# Patient Record
Sex: FEMALE | Race: WHITE | Employment: UNEMPLOYED | URBAN - NONMETROPOLITAN AREA
[De-identification: names, ages, dates, MRNs, and addresses within clinical notes are randomized per-mention and may not be internally consistent; named-entity substitution may affect disease eponyms.]

---

## 2019-06-02 ENCOUNTER — HOSPITAL ENCOUNTER (EMERGENCY)
Age: 39
Discharge: HOME | End: 2019-06-02

## 2019-06-02 VITALS
HEIGHT: 66 IN | RESPIRATION RATE: 16 BRPM | WEIGHT: 160 LBS | DIASTOLIC BLOOD PRESSURE: 72 MMHG | SYSTOLIC BLOOD PRESSURE: 120 MMHG | BODY MASS INDEX: 25.71 KG/M2 | HEART RATE: 87 BPM | OXYGEN SATURATION: 97 % | TEMPERATURE: 98 F

## 2019-06-02 DIAGNOSIS — N30.01 ACUTE CYSTITIS WITH HEMATURIA: Primary | ICD-10-CM

## 2019-06-02 LAB
BILIRUBIN URINE: ABNORMAL
BLOOD, URINE: ABNORMAL
CHARACTER, URINE: ABNORMAL
COLOR: ABNORMAL
GLUCOSE, URINE: NEGATIVE MG/DL
KETONES, URINE: ABNORMAL
LEUKOCYTES, UA: ABNORMAL
NITRATE, UA: POSITIVE
PH UA: 5.5 (ref 5–9)
PROTEIN UA: >= 300 MG/DL
REFLEX TO URINE C & S: ABNORMAL
SPECIFIC GRAVITY UA: >= 1.03 (ref 1–1.03)
UROBILINOGEN, URINE: 0.2 EU/DL (ref 0–1)

## 2019-06-02 PROCEDURE — 99203 OFFICE O/P NEW LOW 30 MIN: CPT

## 2019-06-02 PROCEDURE — 87186 SC STD MICRODIL/AGAR DIL: CPT

## 2019-06-02 PROCEDURE — 87086 URINE CULTURE/COLONY COUNT: CPT

## 2019-06-02 PROCEDURE — 87077 CULTURE AEROBIC IDENTIFY: CPT

## 2019-06-02 PROCEDURE — 99203 OFFICE O/P NEW LOW 30 MIN: CPT | Performed by: NURSE PRACTITIONER

## 2019-06-02 PROCEDURE — 81003 URINALYSIS AUTO W/O SCOPE: CPT

## 2019-06-02 PROCEDURE — 87184 SC STD DISK METHOD PER PLATE: CPT

## 2019-06-02 RX ORDER — PHENAZOPYRIDINE HYDROCHLORIDE 200 MG/1
200 TABLET, FILM COATED ORAL EVERY 8 HOURS PRN
Qty: 9 TABLET | Refills: 0 | Status: SHIPPED | OUTPATIENT
Start: 2019-06-02 | End: 2019-06-05

## 2019-06-02 RX ORDER — BUPROPION HYDROCHLORIDE 300 MG/1
300 TABLET ORAL
COMMUNITY
Start: 2016-12-27

## 2019-06-02 RX ORDER — SULFAMETHOXAZOLE AND TRIMETHOPRIM 800; 160 MG/1; MG/1
1 TABLET ORAL 2 TIMES DAILY
Qty: 14 TABLET | Refills: 0 | Status: SHIPPED | OUTPATIENT
Start: 2019-06-02 | End: 2019-06-09

## 2019-06-02 ASSESSMENT — PAIN DESCRIPTION - PAIN TYPE: TYPE: ACUTE PAIN

## 2019-06-02 ASSESSMENT — PAIN DESCRIPTION - FREQUENCY: FREQUENCY: INTERMITTENT

## 2019-06-02 ASSESSMENT — PAIN DESCRIPTION - DESCRIPTORS: DESCRIPTORS: ACHING;BURNING

## 2019-06-02 ASSESSMENT — PAIN DESCRIPTION - LOCATION: LOCATION: ABDOMEN

## 2019-06-02 ASSESSMENT — PAIN DESCRIPTION - ORIENTATION: ORIENTATION: LOWER

## 2019-06-02 ASSESSMENT — PAIN DESCRIPTION - PROGRESSION: CLINICAL_PROGRESSION: NOT CHANGED

## 2019-06-02 ASSESSMENT — PAIN SCALES - GENERAL: PAINLEVEL_OUTOF10: 2

## 2019-06-02 NOTE — ED NOTES
Discharge assessment complete. No changes. All discharge education and information given. Instructed to go to ED for any worsening symptoms. Verbalized Understanding. Left in stable cond.      Gurdeep Triana RN  06/02/19 [id number]

## 2019-06-02 NOTE — ED TRIAGE NOTES
Pt ambulated to room, tolerated well. Pt stated that yesterday she started to get urine in her blood. Pt stated that it is getting worse.

## 2019-06-04 LAB
ORGANISM: ABNORMAL
URINE CULTURE REFLEX: ABNORMAL

## 2019-06-07 ASSESSMENT — ENCOUNTER SYMPTOMS
BACK PAIN: 0
NAUSEA: 0
ABDOMINAL PAIN: 0
SHORTNESS OF BREATH: 0
DIARRHEA: 0
VOMITING: 0

## 2019-06-07 NOTE — ED PROVIDER NOTES
[facility]      CHIEFCOMPLAINT       Chief Complaint   Patient presents with    Urinary Tract Infection     started yesterday, blood in urine       Nurses Notes reviewed and I agree except as noted in the HPI. HISTORY OF PRESENT ILLNESS   Marleen Bradley is a 37 y.o. female who presents: The history is provided by the patient. Dysuria    This is a new problem. The current episode started yesterday. The problem occurs intermittently. The problem has been gradually worsening. The quality of the pain is described as burning. The pain is at a severity of 2/10. The pain is mild. There has been no fever. Associated symptoms include frequency, hematuria and urgency. Pertinent negatives include no chills, no sweats, no nausea, no vomiting, no discharge and no flank pain. She has tried nothing for the symptoms. Her past medical history does not include recurrent UTIs. REVIEW OF SYSTEMS     Review of Systems   Constitutional: Negative for activity change, appetite change, chills, diaphoresis, fatigue and fever. Eyes: Negative for visual disturbance. Respiratory: Negative for shortness of breath. Cardiovascular: Negative for chest pain. Gastrointestinal: Negative for abdominal pain, diarrhea, nausea and vomiting. Genitourinary: Positive for dysuria, frequency, hematuria and urgency. Negative for difficulty urinating, flank pain, pelvic pain and vaginal bleeding. Musculoskeletal: Negative for arthralgias and back pain. Neurological: Negative for dizziness and headaches. Hematological: Negative for adenopathy. Psychiatric/Behavioral: The patient is not nervous/anxious. PAST MEDICAL HISTORY   History reviewed. No pertinent past medical history. SURGICAL HISTORY     Patient  has no past surgical history on file.     CURRENT MEDICATIONS       Discharge Medication List as of 6/2/2019  5:31 PM      CONTINUE these medications which have NOT CHANGED is not diaphoretic. No cyanosis or erythema. No pallor. No obvious signs of infection or trauma. Psychiatric: She has a normal mood and affect. Her behavior is normal.   Nursing note and vitals reviewed. DIAGNOSTIC RESULTS   Labs:  Results for orders placed or performed during the hospital encounter of 06/02/19   Urine Culture, Reflexed   Result Value Ref Range    Organism Escherichia coli (A)     Urine Culture Reflex [place] count: >100,000 CFU/mL        Susceptibility    Escherichia coli - BACTERIAL SUSCEPTIBILITY PANEL BY NIK     gentamicin <=1 Sensitive mcg/mL     tetracycline <=1 Sensitive mcg/mL     cefOXitin <=4 Sensitive mcg/mL     nitrofurantoin <=16 Sensitive mcg/mL     ciprofloxacin =0.012 Sensitive mcg/mL     trimethoprim-sulfamethoxazole <=20 Sensitive mcg/mL     cefuroxime  Sensitive    UA without Microscopic Reflex C&S   Result Value Ref Range    Glucose, Urine Negative NEGATIVE mg/dl    Bilirubin Urine Small (A) NEGATIVE    Ketones, Urine Trace (A) NEGATIVE    Specific Gravity, UA >=1.030 1.002 - 1.03    Blood, Urine Large (A) NEGATIVE    pH, UA 5.50 5.0 - 9.0    Protein, UA >= 300 (A) NEGATIVE mg/dl    Urobilinogen, Urine 0.20 0.0 - 1.0 eu/dl    Nitrate, UA Positive (A) NEGATIVE    LEUKOCYTES, UA Moderate (A) NEGATIVE    Color, UA Dark yellow (A) STRAW-YELL    Character, Urine Cloudy (A) CLEAR-SL C    REFLEX TO URINE C & S INDICATED        IMAGING:    URGENT CARE COURSE:     Vitals:    06/02/19 1706   BP: 120/72   Pulse: 87   Resp: 16   Temp: 98 °F (36.7 °C)   TempSrc: Tympanic   SpO2: 97%   Weight: 160 lb (72.6 kg)   Height: 5' 6\" (1.676 m)       Medications - No data to display  PROCEDURES:  None  FINAL IMPRESSION       1.  Acute cystitis with hematuria        DISPOSITION/PLAN   DISPOSITION Decision To Discharge 06/02/2019 05:29:49 PM   UA positive for nitrates, Leuks, blood sent for urine culture  Start on Bactrim DS and pyridium as directed  Patient advised to drink plenty of fluids and take medication as prescribed. Advised to follow up with family doctor. Patient is also advised to go to emergency room if symptoms worsen with high fever, chills, persistent vomiting, increased back or flank pain, abdominal pain or any new symptoms develop. PATIENT REFERRED TO:  [address] [address],[address] [facility]. [address]  [phone]  Call in 3 days  if no improvement, If no improvement of symptoms    Patient instructed to follow up with PCP. If symptoms worsen, become severe or new symptoms develop patient instructedto go to the emergency room immediately. DISCHARGE MEDICATIONS:  Discharge Medication List as of 6/2/2019  5:31 PM      START taking these medications    Details   phenazopyridine (PYRIDIUM) 200 MG tablet Take 1 tablet by mouth every 8 hours as needed for Pain, Disp-9 tablet, R-0Normal      sulfamethoxazole-trimethoprim (BACTRIM DS;SEPTRA DS) 800-160 MG per tablet Take 1 tablet by mouth 2 times daily for 7 days, Disp-14 tablet, R-0Normal           Discharge Medication List as of 6/2/2019  5:31 PM          Patient giveneducational materials - see patient instructions. Discussed use, benefit, and side effects of prescribed medications. All patient questions answered. Pt voiced understanding. Reviewed health maintenance. Patient agreedwith treatment plan. Follow up as directed.      YESIKA Beck CNP, APRN - CNP  06/07/19 [id number]